# Patient Record
Sex: FEMALE | Race: WHITE
[De-identification: names, ages, dates, MRNs, and addresses within clinical notes are randomized per-mention and may not be internally consistent; named-entity substitution may affect disease eponyms.]

---

## 2018-01-28 ENCOUNTER — HOSPITAL ENCOUNTER (EMERGENCY)
Dept: HOSPITAL 25 - ED | Age: 33
LOS: 1 days | Discharge: LEFT BEFORE DISCHARGE | End: 2018-01-29
Payer: COMMERCIAL

## 2018-01-28 DIAGNOSIS — R05: Primary | ICD-10-CM

## 2018-01-28 DIAGNOSIS — Z53.21: ICD-10-CM

## 2020-01-28 ENCOUNTER — HOSPITAL ENCOUNTER (EMERGENCY)
Dept: HOSPITAL 25 - ED | Age: 35
Discharge: HOME | End: 2020-01-28
Payer: COMMERCIAL

## 2020-01-28 VITALS — SYSTOLIC BLOOD PRESSURE: 121 MMHG | DIASTOLIC BLOOD PRESSURE: 63 MMHG

## 2020-01-28 DIAGNOSIS — Z32.02: ICD-10-CM

## 2020-01-28 DIAGNOSIS — R07.89: ICD-10-CM

## 2020-01-28 DIAGNOSIS — R00.2: ICD-10-CM

## 2020-01-28 DIAGNOSIS — Z88.6: ICD-10-CM

## 2020-01-28 DIAGNOSIS — R55: Primary | ICD-10-CM

## 2020-01-28 DIAGNOSIS — R42: ICD-10-CM

## 2020-01-28 DIAGNOSIS — Z88.0: ICD-10-CM

## 2020-01-28 LAB
ALBUMIN SERPL BCG-MCNC: 4.8 G/DL (ref 3.2–5.2)
ALBUMIN/GLOB SERPL: 1.8 {RATIO} (ref 1–3)
ALP SERPL-CCNC: 37 U/L (ref 34–104)
ALT SERPL W P-5'-P-CCNC: 15 U/L (ref 7–52)
ANION GAP SERPL CALC-SCNC: 8 MMOL/L (ref 2–11)
AST SERPL-CCNC: 17 U/L (ref 13–39)
BASOPHILS # BLD AUTO: 0.1 10^3/UL (ref 0–0.2)
BUN SERPL-MCNC: 11 MG/DL (ref 6–24)
BUN/CREAT SERPL: 13.9 (ref 8–20)
CALCIUM SERPL-MCNC: 9.3 MG/DL (ref 8.6–10.3)
CHLORIDE SERPL-SCNC: 104 MMOL/L (ref 101–111)
EOSINOPHIL # BLD AUTO: 0.1 10^3/UL (ref 0–0.6)
GLOBULIN SER CALC-MCNC: 2.6 G/DL (ref 2–4)
GLUCOSE SERPL-MCNC: 98 MG/DL (ref 70–100)
HCG SERPL QL: < 0.6 MIU/ML
HCO3 SERPL-SCNC: 27 MMOL/L (ref 22–32)
HCT VFR BLD AUTO: 41 % (ref 35–47)
HGB BLD-MCNC: 14.2 G/DL (ref 12–16)
LYMPHOCYTES # BLD AUTO: 2.4 10^3/UL (ref 1–4.8)
MCH RBC QN AUTO: 31 PG (ref 27–31)
MCHC RBC AUTO-ENTMCNC: 35 G/DL (ref 31–36)
MCV RBC AUTO: 89 FL (ref 80–97)
MONOCYTES # BLD AUTO: 0.4 10^3/UL (ref 0–0.8)
NEUTROPHILS # BLD AUTO: 4.4 10^3/UL (ref 1.5–7.7)
NRBC # BLD AUTO: 0 10^3/UL
NRBC BLD QL AUTO: 0
PLATELET # BLD AUTO: 233 10^3/UL (ref 150–450)
POTASSIUM SERPL-SCNC: 3.2 MMOL/L (ref 3.5–5)
PROT SERPL-MCNC: 7.4 G/DL (ref 6.4–8.9)
RBC # BLD AUTO: 4.57 10^6 /UL (ref 3.7–4.87)
SODIUM SERPL-SCNC: 139 MMOL/L (ref 135–145)
WBC # BLD AUTO: 7.4 10^3/UL (ref 3.5–10.8)

## 2020-01-28 PROCEDURE — 36415 COLL VENOUS BLD VENIPUNCTURE: CPT

## 2020-01-28 PROCEDURE — 96360 HYDRATION IV INFUSION INIT: CPT

## 2020-01-28 PROCEDURE — 96361 HYDRATE IV INFUSION ADD-ON: CPT

## 2020-01-28 PROCEDURE — 85379 FIBRIN DEGRADATION QUANT: CPT

## 2020-01-28 PROCEDURE — 84702 CHORIONIC GONADOTROPIN TEST: CPT

## 2020-01-28 PROCEDURE — 85025 COMPLETE CBC W/AUTO DIFF WBC: CPT

## 2020-01-28 PROCEDURE — 99282 EMERGENCY DEPT VISIT SF MDM: CPT

## 2020-01-28 PROCEDURE — 93005 ELECTROCARDIOGRAM TRACING: CPT

## 2020-01-28 PROCEDURE — 80053 COMPREHEN METABOLIC PANEL: CPT

## 2020-01-28 NOTE — ED
Syncope/Near Syncope





- HPI Summary


HPI Summary: 





Patient is a 33 y/o F presenting to the ED for a chief complaint of pre-

syncope. Patient is present with her . Patient reports that she had 

palpitations at work around 15:45 on 01/28/20 when she became lightheaded and 

felt she would have a syncopal episode after bending down. She stood up and 

continued to feel palpitations, which lasted for 1 minute before resolving. She 

describes the palpitations are being loud in her throat. She notes being 

flushed after the episode. She has had a general feeling of being unwell for 

the last week. Currently, she complains of chest tightness. Patient denies 

vomiting. She denies taking any recent long distance trips. PMHx is significant 

for IBS. FMHx is significant for cardiac disease. Patient denies tobacco use. 








- History Of Current Complaint


Time Seen by Provider: 01/28/20 17:07


Hx Obtained From: Patient


Onset/Duration: Sudden Onset, Still Present


Timing: Constant


Activity At Onset: At Rest


Aggravating Factor(s): Position Change


Alleviating Factor(s): Spontaneous Resolution


Associated Signs And Symptoms: Chest Pain - Tightness, Lightheadedness, 

Palpitations - Resolved





- Allergies/Home Medications


Allergies/Adverse Reactions: 


 Allergies











Allergy/AdvReac Type Severity Reaction Status Date / Time


 


aspirin Allergy  See Comment Verified 04/11/18 12:26


 


Penicillins Allergy  See Comment Verified 04/11/18 12:26














PMH/Surg Hx/FS Hx/Imm Hx


Previously Healthy: Yes


Endocrine/Hematology History: 


   Denies: Hx Diabetes, Hx Thyroid Disease


Cardiovascular History: 


   Denies: Hx Hypertension


Respiratory History: 


   Denies: Hx Asthma, Hx Chronic Obstructive Pulmonary Disease (COPD)


GI History: Reports: Hx Irritable Bowel


   Denies: Hx Ulcer


Sensory History: 


   Denies: Hx Legally Blind, Hx Deafness


Opthamlomology History: 


   Denies: Hx Legally Blind


EENT History: 


   Denies: Hx Deafness





- Surgical History


Surgical History: None


Surgery Procedure, Year, and Place: None


Infectious Disease History: No


Infectious Disease History: 


   Denies: Hx Clostridium Difficile, Hx Hepatitis, Hx Human Immunodeficiency 

Virus (HIV), Hx of Known/Suspected MRSA, Hx Shingles, Hx Tuberculosis, Hx Known/

Suspected VRE, Hx Known/Suspected VRSA, History Other Infectious Disease





- Family History


Known Family History: Positive: Cardiac Disease





- Social History


Occupation: Employed Full-time


Lives: With Family


Alcohol Use: Rare


Alcohol Amount: 2 month


Hx Substance Use: No


Substance Use Type: Reports: None


Hx Tobacco Use: No


Smoking Status (MU): Never Smoked Tobacco


Have You Smoked in the Last Year: No





Review of Systems


Positive: Palpitations - Resolved, Chest Pain - Tightness


Negative: Vomiting


Neurological: Other - Positive pre-syncope and lightheadedness


All Other Systems Reviewed And Are Negative: Yes





Physical Exam





- Summary


Physical Exam Summary: 





Constitutional: Well-developed, Well-nourished, Alert. (-) Distressed


Skin: Warm, Dry


HENT: Normocephalic; Atraumatic


Eyes: Conjunctiva normal


Neck: Musculoskeletal ROM normal neck. (-) JVD, (-) Stridor, (-) Nuchal rigidity


Cardio: Rhythm regular, Tachycardic, Heart sounds normal; Intact distal pulses; 

Radial pulses are 2+ and symmetric. (-) Murmur


Pulmonary/Chest wall: Effort normal. (-) Respiratory distress, (-) Wheezes, (-) 

Rales


Abd: Soft, (-) tenderness, (-) Distension, (-) Guarding, (-) Rebound


Musculoskeletal: (-) Edema


Lymph: (-) Cervical adenopathy


Neuro: Alert, Oriented x3


Psych: Mood and affect Normal


Triage Information Reviewed: Yes


Vital Signs Reviewed: Yes





Procedures





- Sedation


Patient Received Moderate/Deep Sedation with Procedure: No





Diagnostics





- Laboratory


Result Diagrams: 


 01/28/20 17:28





 01/28/20 17:28


Lab Statement: Any lab studies that have been ordered have been reviewed, and 

results considered in the medical decision making process.





- EKG


  ** 17:11


Cardiac Rate: NL - 82 BPM


EKG Rhythm: Sinus Rhythm


ST Segment: Normal


Ectopy: None


Summary of EKG Findings: An EKG at 17:11 reveals normal sinus rhythm with 82 BPM

, T wave inversions in V1 and V2, nml axis, nml intervals. No STEMI. No acute 

changes. No change from prior EKG.  ED physician has reviewed and interpreted 

this EKG.





Course/Dx


Course Of Treatment: 33 y/o F p/w near syncope.  - reporting palpitations.  

near syncope differential diagnosis includes:  most likely vasovagal given 

reports of bending down then standing up with symptoms.  Cardiac causes - will 

check EKG, get orthostatics.  D dimer neg do not suspect PE.  Neg HCG, do not 

suspect ectopic.  Electrolyte disturbances - will check CMP (K 3.3, repleted).  

Anemia - will check CBC (stable hb).  Orthostatic VS: unremarkable. Given IVF. 

Did have recent diarrheal episode which could be contributing to symptoms





- Diagnoses


Provider Diagnoses: 


 Near syncope, Palpitations, Lightheadedness








Discharge ED





- Sign-Out/Discharge


Documenting (check all that apply): Patient Departure - Discharge





- Discharge Plan


Condition: Stable


Disposition: HOME


Patient Education Materials:  Near Syncope (ED)


Referrals: 


Ilene Wyatt NP [Nurse Practitioner] - 


Additional Instructions: 


You were seen in the emergency department for near syncope.  Your labs did not 

show any abnormalities. Please drink lots of fluids. 


Please follow up with your primary care doctor in next 2-3 days and return to 

emergency department for passing out, chest pain, trouble breathing, worsening 

or concerning symptoms.


It was a pleasure taking care of you today.





- Billing Disposition and Condition


Condition: STABLE


Disposition: Home





- Attestation Statements


Document Initiated by Scribe: Yes


Documenting Scribe: Jodie Toro


Provider For Whom Matt is Documenting (Include Credential): Amando Shipley MD


Scribe Attestation: 


I, Jodie Toro, scribed for Amando Shiplye MD on 01/28/20 at 1917. 


Scribe Documentation Reviewed: Yes


Provider Attestation: 


The documentation as recorded by the Jodie velásquez accurately reflects 

the service I personally performed and the decisions made by me, Amando Shipley MD


Status of Scribe Document: Viewed